# Patient Record
Sex: FEMALE | Race: WHITE | NOT HISPANIC OR LATINO | URBAN - METROPOLITAN AREA
[De-identification: names, ages, dates, MRNs, and addresses within clinical notes are randomized per-mention and may not be internally consistent; named-entity substitution may affect disease eponyms.]

---

## 2017-01-03 ENCOUNTER — EMERGENCY (EMERGENCY)
Facility: HOSPITAL | Age: 32
LOS: 1 days | Discharge: HOME | End: 2017-01-03
Attending: EMERGENCY MEDICINE | Admitting: EMERGENCY MEDICINE
Payer: OTHER GOVERNMENT

## 2017-01-03 ENCOUNTER — APPOINTMENT (OUTPATIENT)
Dept: OBGYN | Facility: CLINIC | Age: 32
End: 2017-01-03

## 2017-01-03 VITALS
TEMPERATURE: 98 F | HEART RATE: 85 BPM | DIASTOLIC BLOOD PRESSURE: 127 MMHG | OXYGEN SATURATION: 98 % | SYSTOLIC BLOOD PRESSURE: 169 MMHG | RESPIRATION RATE: 18 BRPM

## 2017-01-03 VITALS
OXYGEN SATURATION: 100 % | SYSTOLIC BLOOD PRESSURE: 107 MMHG | HEART RATE: 96 BPM | DIASTOLIC BLOOD PRESSURE: 85 MMHG | RESPIRATION RATE: 20 BRPM

## 2017-01-03 DIAGNOSIS — Z34.03 ENCOUNTER FOR SUPERVISION OF NORMAL FIRST PREGNANCY, THIRD TRIMESTER: ICD-10-CM

## 2017-01-03 DIAGNOSIS — O09.293 SUPERVISION OF PREGNANCY WITH OTHER POOR REPRODUCTIVE OR OBSTETRIC HISTORY, THIRD TRIMESTER: ICD-10-CM

## 2017-01-03 LAB
ALBUMIN SERPL ELPH-MCNC: 4.7 G/DL — SIGNIFICANT CHANGE UP (ref 3.3–5)
ALP SERPL-CCNC: 115 U/L — SIGNIFICANT CHANGE UP (ref 40–120)
ALT FLD-CCNC: 68 U/L RC — HIGH (ref 10–45)
ANION GAP SERPL CALC-SCNC: 16 MMOL/L — SIGNIFICANT CHANGE UP (ref 5–17)
APTT BLD: 32.2 SEC — SIGNIFICANT CHANGE UP (ref 27.5–37.4)
AST SERPL-CCNC: 41 U/L — HIGH (ref 10–40)
BASOPHILS # BLD AUTO: 0 K/UL — SIGNIFICANT CHANGE UP (ref 0–0.2)
BASOPHILS NFR BLD AUTO: 0.4 % — SIGNIFICANT CHANGE UP (ref 0–2)
BILIRUB SERPL-MCNC: 0.2 MG/DL — SIGNIFICANT CHANGE UP (ref 0.2–1.2)
BUN SERPL-MCNC: 16 MG/DL — SIGNIFICANT CHANGE UP (ref 7–23)
CALCIUM SERPL-MCNC: 11.1 MG/DL — HIGH (ref 8.4–10.5)
CHLORIDE SERPL-SCNC: 104 MMOL/L — SIGNIFICANT CHANGE UP (ref 96–108)
CO2 SERPL-SCNC: 22 MMOL/L — SIGNIFICANT CHANGE UP (ref 22–31)
CREAT SERPL-MCNC: 0.88 MG/DL — SIGNIFICANT CHANGE UP (ref 0.5–1.3)
EOSINOPHIL # BLD AUTO: 0.2 K/UL — SIGNIFICANT CHANGE UP (ref 0–0.5)
EOSINOPHIL NFR BLD AUTO: 2.5 % — SIGNIFICANT CHANGE UP (ref 0–6)
GLUCOSE SERPL-MCNC: 105 MG/DL — HIGH (ref 70–99)
HCT VFR BLD CALC: 38.1 % — SIGNIFICANT CHANGE UP (ref 34.5–45)
HGB BLD-MCNC: 13.2 G/DL — SIGNIFICANT CHANGE UP (ref 11.5–15.5)
INR BLD: 1.01 RATIO — SIGNIFICANT CHANGE UP (ref 0.88–1.16)
LYMPHOCYTES # BLD AUTO: 2.6 K/UL — SIGNIFICANT CHANGE UP (ref 1–3.3)
LYMPHOCYTES # BLD AUTO: 28.5 % — SIGNIFICANT CHANGE UP (ref 13–44)
MCHC RBC-ENTMCNC: 28.8 PG — SIGNIFICANT CHANGE UP (ref 27–34)
MCHC RBC-ENTMCNC: 34.7 GM/DL — SIGNIFICANT CHANGE UP (ref 32–36)
MCV RBC AUTO: 82.9 FL — SIGNIFICANT CHANGE UP (ref 80–100)
MONOCYTES # BLD AUTO: 0.6 K/UL — SIGNIFICANT CHANGE UP (ref 0–0.9)
MONOCYTES NFR BLD AUTO: 6.1 % — SIGNIFICANT CHANGE UP (ref 2–14)
NEUTROPHILS # BLD AUTO: 5.7 K/UL — SIGNIFICANT CHANGE UP (ref 1.8–7.4)
NEUTROPHILS NFR BLD AUTO: 62.5 % — SIGNIFICANT CHANGE UP (ref 43–77)
PLATELET # BLD AUTO: 327 K/UL — SIGNIFICANT CHANGE UP (ref 150–400)
POTASSIUM SERPL-MCNC: 4.2 MMOL/L — SIGNIFICANT CHANGE UP (ref 3.5–5.3)
POTASSIUM SERPL-SCNC: 4.2 MMOL/L — SIGNIFICANT CHANGE UP (ref 3.5–5.3)
PROT SERPL-MCNC: 7.4 G/DL — SIGNIFICANT CHANGE UP (ref 6–8.3)
PROTHROM AB SERPL-ACNC: 10.9 SEC — SIGNIFICANT CHANGE UP (ref 10–13.1)
RBC # BLD: 4.59 M/UL — SIGNIFICANT CHANGE UP (ref 3.8–5.2)
RBC # FLD: 13.5 % — SIGNIFICANT CHANGE UP (ref 10.3–14.5)
SODIUM SERPL-SCNC: 142 MMOL/L — SIGNIFICANT CHANGE UP (ref 135–145)
URATE SERPL-MCNC: 5.4 MG/DL — SIGNIFICANT CHANGE UP (ref 2.5–7)
WBC # BLD: 9.2 K/UL — SIGNIFICANT CHANGE UP (ref 3.8–10.5)
WBC # FLD AUTO: 9.2 K/UL — SIGNIFICANT CHANGE UP (ref 3.8–10.5)

## 2017-01-03 PROCEDURE — 99053 MED SERV 10PM-8AM 24 HR FAC: CPT

## 2017-01-03 PROCEDURE — 85610 PROTHROMBIN TIME: CPT

## 2017-01-03 PROCEDURE — 93005 ELECTROCARDIOGRAM TRACING: CPT

## 2017-01-03 PROCEDURE — 93010 ELECTROCARDIOGRAM REPORT: CPT

## 2017-01-03 PROCEDURE — 84550 ASSAY OF BLOOD/URIC ACID: CPT

## 2017-01-03 PROCEDURE — 85027 COMPLETE CBC AUTOMATED: CPT

## 2017-01-03 PROCEDURE — 99284 EMERGENCY DEPT VISIT MOD MDM: CPT | Mod: 25

## 2017-01-03 PROCEDURE — 85730 THROMBOPLASTIN TIME PARTIAL: CPT

## 2017-01-03 PROCEDURE — 80053 COMPREHEN METABOLIC PANEL: CPT

## 2017-01-03 PROCEDURE — 96374 THER/PROPH/DIAG INJ IV PUSH: CPT

## 2017-01-03 RX ORDER — MAGNESIUM SULFATE 500 MG/ML
4 VIAL (ML) INJECTION ONCE
Qty: 0 | Refills: 0 | Status: DISCONTINUED | OUTPATIENT
Start: 2017-01-03 | End: 2017-01-03

## 2017-01-03 RX ORDER — LABETALOL HCL 100 MG
10 TABLET ORAL ONCE
Qty: 0 | Refills: 0 | Status: COMPLETED | OUTPATIENT
Start: 2017-01-03 | End: 2017-01-03

## 2017-01-03 RX ORDER — MAGNESIUM SULFATE 500 MG/ML
2 VIAL (ML) INJECTION
Qty: 40 | Refills: 0 | Status: DISCONTINUED | OUTPATIENT
Start: 2017-01-03 | End: 2017-01-03

## 2017-01-03 RX ADMIN — Medication 10 MILLIGRAM(S): at 01:59

## 2017-01-03 NOTE — ED PROVIDER NOTE - ATTENDING CONTRIBUTION TO CARE
I was physically present for the E/M service provided. I agree with above history, physical, and plan which I have reviewed and edited where appropriate. I was physically present for the key portions of the service provided.    See MDM.

## 2017-01-03 NOTE — ED PROVIDER NOTE - PROGRESS NOTE DETAILS
Seen by cardiology in ED. Cleared for d/c with close outpatient follow-up and strict return precautions.  Alfredo PGY-3 Evaluated by OBGYN in ED. 6 weeks postpartum, out of window for mag as per OBGYN. Also seen by cardiology in ED. Cleared for d/c with close outpatient follow-up and strict return precautions.  Alfredo PGY-3

## 2017-01-03 NOTE — ED ADULT NURSE NOTE - OBJECTIVE STATEMENT
31 y.o. Female presents to the ED c/o high blood pressure. Pt is 6 weeks post partum - 3rd pregnancy. Hx of chronic HTN. Pt reports feeling "light headed around 0730pm and took 500mg of labetalol." Pt also claims taking "procardia at 1010pm and 12am." Denies any light headedness now, dizziness, vision/hearing changes, weakness/numbness/tingling, N/V/D, urinary/bowel complications, fever/chills. Pt is in no current distress. Comfort and safety provided. Family at bedside.

## 2017-01-03 NOTE — ED PROVIDER NOTE - MEDICAL DECISION MAKING DETAILS
HTN 6 weeks post-partum. Ob/Gyn consulted state pt is outside window for eclampsia. Recommend f/u with cardiology for continued BP management. Pt denies HA, dizziness or vision changes. Labs WNL. BP improved with meds in ED. f/u with cards outpt. JESSICA.

## 2017-01-03 NOTE — ED PROVIDER NOTE - OBJECTIVE STATEMENT
31yF h/o chronic HTN (follows with Marely Reaves) and recent pregnancy (6 weeks post-partum), pregnancy c/b preeclampsia on procardia and labetalol, compliant with medications presents for  high blood pressure. Sent in after readings of systolic BP in 180s at home. Patient checks BP regularly, was asymptomatic. Denies chest pain, headache, vision changes, syncope. No h/o eclampsia. Called Dr. Reaves's service after high BP reading and was advised to come to the ER. OBGYN Dr. Levy.

## 2017-01-05 ENCOUNTER — APPOINTMENT (OUTPATIENT)
Dept: CARDIOLOGY | Facility: CLINIC | Age: 32
End: 2017-01-05

## 2017-01-05 VITALS
BODY MASS INDEX: 25.91 KG/M2 | HEART RATE: 87 BPM | DIASTOLIC BLOOD PRESSURE: 93 MMHG | SYSTOLIC BLOOD PRESSURE: 131 MMHG | WEIGHT: 132 LBS | HEIGHT: 60 IN | OXYGEN SATURATION: 99 %

## 2017-01-06 ENCOUNTER — APPOINTMENT (OUTPATIENT)
Dept: OBGYN | Facility: CLINIC | Age: 32
End: 2017-01-06

## 2017-01-10 ENCOUNTER — APPOINTMENT (OUTPATIENT)
Dept: OBGYN | Facility: CLINIC | Age: 32
End: 2017-01-10

## 2017-01-10 VITALS
SYSTOLIC BLOOD PRESSURE: 160 MMHG | DIASTOLIC BLOOD PRESSURE: 100 MMHG | BODY MASS INDEX: 26.11 KG/M2 | HEIGHT: 60 IN | WEIGHT: 133 LBS

## 2017-01-10 DIAGNOSIS — Z30.09 ENCOUNTER FOR OTHER GENERAL COUNSELING AND ADVICE ON CONTRACEPTION: ICD-10-CM

## 2017-02-07 ENCOUNTER — APPOINTMENT (OUTPATIENT)
Dept: CARDIOLOGY | Facility: CLINIC | Age: 32
End: 2017-02-07

## 2017-02-07 VITALS
SYSTOLIC BLOOD PRESSURE: 144 MMHG | HEART RATE: 98 BPM | DIASTOLIC BLOOD PRESSURE: 106 MMHG | WEIGHT: 125 LBS | HEIGHT: 60 IN | OXYGEN SATURATION: 99 % | BODY MASS INDEX: 24.54 KG/M2

## 2017-02-07 VITALS — SYSTOLIC BLOOD PRESSURE: 118 MMHG | DIASTOLIC BLOOD PRESSURE: 92 MMHG

## 2017-02-07 RX ORDER — NIFEDIPINE 30 MG/1
30 TABLET, FILM COATED, EXTENDED RELEASE ORAL
Qty: 30 | Refills: 5 | Status: DISCONTINUED | COMMUNITY
Start: 2017-01-05 | End: 2017-02-07

## 2017-03-16 ENCOUNTER — APPOINTMENT (OUTPATIENT)
Dept: CARDIOLOGY | Facility: CLINIC | Age: 32
End: 2017-03-16

## 2017-03-16 VITALS
SYSTOLIC BLOOD PRESSURE: 139 MMHG | HEIGHT: 60 IN | HEART RATE: 86 BPM | DIASTOLIC BLOOD PRESSURE: 99 MMHG | BODY MASS INDEX: 25.52 KG/M2 | WEIGHT: 130 LBS | OXYGEN SATURATION: 100 %

## 2017-03-16 VITALS — SYSTOLIC BLOOD PRESSURE: 130 MMHG | DIASTOLIC BLOOD PRESSURE: 86 MMHG

## 2017-05-04 ENCOUNTER — APPOINTMENT (OUTPATIENT)
Dept: CARDIOLOGY | Facility: CLINIC | Age: 32
End: 2017-05-04

## 2017-05-04 VITALS
HEART RATE: 92 BPM | SYSTOLIC BLOOD PRESSURE: 150 MMHG | HEIGHT: 60 IN | BODY MASS INDEX: 27.09 KG/M2 | DIASTOLIC BLOOD PRESSURE: 107 MMHG | OXYGEN SATURATION: 100 % | WEIGHT: 138 LBS

## 2017-05-04 VITALS — SYSTOLIC BLOOD PRESSURE: 126 MMHG | DIASTOLIC BLOOD PRESSURE: 86 MMHG

## 2019-09-28 ENCOUNTER — APPOINTMENT (OUTPATIENT)
Dept: INTERNAL MEDICINE | Facility: CLINIC | Age: 34
End: 2019-09-28
Payer: OTHER GOVERNMENT

## 2019-09-28 VITALS
BODY MASS INDEX: 29.25 KG/M2 | SYSTOLIC BLOOD PRESSURE: 120 MMHG | DIASTOLIC BLOOD PRESSURE: 70 MMHG | TEMPERATURE: 99.2 F | WEIGHT: 149 LBS | OXYGEN SATURATION: 99 % | HEART RATE: 96 BPM | HEIGHT: 60 IN

## 2019-09-28 DIAGNOSIS — Z00.00 ENCOUNTER FOR GENERAL ADULT MEDICAL EXAMINATION W/OUT ABNORMAL FINDINGS: ICD-10-CM

## 2019-09-28 DIAGNOSIS — Z83.3 FAMILY HISTORY OF DIABETES MELLITUS: ICD-10-CM

## 2019-09-28 DIAGNOSIS — I10 ESSENTIAL (PRIMARY) HYPERTENSION: ICD-10-CM

## 2019-09-28 DIAGNOSIS — G89.29 DORSALGIA, UNSPECIFIED: ICD-10-CM

## 2019-09-28 DIAGNOSIS — M54.9 DORSALGIA, UNSPECIFIED: ICD-10-CM

## 2019-09-28 DIAGNOSIS — Z82.49 FAMILY HISTORY OF ISCHEMIC HEART DISEASE AND OTHER DISEASES OF THE CIRCULATORY SYSTEM: ICD-10-CM

## 2019-09-28 PROCEDURE — 36415 COLL VENOUS BLD VENIPUNCTURE: CPT

## 2019-09-28 PROCEDURE — 99385 PREV VISIT NEW AGE 18-39: CPT | Mod: 25

## 2019-09-28 NOTE — ASSESSMENT
[FreeTextEntry1] : 1) CPE \par - diet / exercise discussed \par - check lipid and BG \par - UTD PAP / Tdap \par \par 2) HTN\par - ct meds \par - check labs\par - psychology referral for stress \par \par 3) back pain \par - long standing / getting worse / radiates to L leg\par - p.t \par - x-RAY

## 2019-09-28 NOTE — HEALTH RISK ASSESSMENT
[Good] : ~his/her~ current health as good [No] : No [] : No [0] : 1) Little interest or pleasure doing things: Not at all (0) [1] : 2) Feeling down, depressed, or hopeless for several days (1) [de-identified] : low salt diet  [de-identified] : on and off  [FreeTextEntry1] : anxiety -  left for [place]  [None] : None [With Significant Other] : lives with significant other [Unemployed] : unemployed [] :  [Sexually Active] : sexually active [Feels Safe at Home] : Feels safe at home [Reports changes in hearing] : Reports no changes in hearing [Reports changes in vision] : Reports changes in vision [Reports changes in dental health] : Reports changes in dental health [Carbon Monoxide Detector] : carbon monoxide detector [Safety elements used in home] : safety elements used in home [Smoke Detector] : smoke detector [Seat Belt] :  uses seat belt [PapSmearDate] : 2018

## 2019-09-28 NOTE — HISTORY OF PRESENT ILLNESS
[FreeTextEntry1] : here to establish care\par h/o HTN X 18 yrs \par \par Has had low back pain x many yrs , radiates  to the L leg\par gets worse with standing , and at night\par has never had PT , no h/o injury to back

## 2019-09-30 ENCOUNTER — TRANSCRIPTION ENCOUNTER (OUTPATIENT)
Age: 34
End: 2019-09-30

## 2019-10-01 LAB
25(OH)D3 SERPL-MCNC: 21.7 NG/ML
ALBUMIN SERPL ELPH-MCNC: 4.8 G/DL
ALP BLD-CCNC: 88 U/L
ALT SERPL-CCNC: 36 U/L
ANION GAP SERPL CALC-SCNC: 16 MMOL/L
AST SERPL-CCNC: 25 U/L
BASOPHILS # BLD AUTO: 0.06 K/UL
BASOPHILS NFR BLD AUTO: 0.6 %
BILIRUB SERPL-MCNC: 0.4 MG/DL
BUN SERPL-MCNC: 10 MG/DL
CALCIUM SERPL-MCNC: 9.9 MG/DL
CHLORIDE SERPL-SCNC: 103 MMOL/L
CHOLEST SERPL-MCNC: 165 MG/DL
CHOLEST/HDLC SERPL: 4 RATIO
CO2 SERPL-SCNC: 22 MMOL/L
CREAT SERPL-MCNC: 0.66 MG/DL
EOSINOPHIL # BLD AUTO: 0.14 K/UL
EOSINOPHIL NFR BLD AUTO: 1.3 %
ESTIMATED AVERAGE GLUCOSE: 105 MG/DL
GLUCOSE SERPL-MCNC: 99 MG/DL
HBA1C MFR BLD HPLC: 5.3 %
HCT VFR BLD CALC: 44.2 %
HDLC SERPL-MCNC: 41 MG/DL
HGB BLD-MCNC: 14.2 G/DL
IMM GRANULOCYTES NFR BLD AUTO: 0.2 %
LDLC SERPL CALC-MCNC: 90 MG/DL
LYMPHOCYTES # BLD AUTO: 1.71 K/UL
LYMPHOCYTES NFR BLD AUTO: 16.2 %
MAN DIFF?: NORMAL
MCHC RBC-ENTMCNC: 27.3 PG
MCHC RBC-ENTMCNC: 32.1 GM/DL
MCV RBC AUTO: 84.8 FL
MONOCYTES # BLD AUTO: 0.63 K/UL
MONOCYTES NFR BLD AUTO: 6 %
NEUTROPHILS # BLD AUTO: 7.98 K/UL
NEUTROPHILS NFR BLD AUTO: 75.7 %
PLATELET # BLD AUTO: 339 K/UL
POTASSIUM SERPL-SCNC: 4.4 MMOL/L
PROT SERPL-MCNC: 7.4 G/DL
RBC # BLD: 5.21 M/UL
RBC # FLD: 14 %
SODIUM SERPL-SCNC: 141 MMOL/L
TRIGL SERPL-MCNC: 170 MG/DL
TSH SERPL-ACNC: 3.55 UIU/ML
WBC # FLD AUTO: 10.54 K/UL

## 2019-10-23 ENCOUNTER — FORM ENCOUNTER (OUTPATIENT)
Age: 34
End: 2019-10-23

## 2019-10-24 ENCOUNTER — OUTPATIENT (OUTPATIENT)
Dept: OUTPATIENT SERVICES | Facility: HOSPITAL | Age: 34
LOS: 1 days | End: 2019-10-24
Payer: OTHER GOVERNMENT

## 2019-10-24 ENCOUNTER — APPOINTMENT (OUTPATIENT)
Dept: RADIOLOGY | Facility: CLINIC | Age: 34
End: 2019-10-24
Payer: OTHER GOVERNMENT

## 2019-10-24 DIAGNOSIS — M54.9 DORSALGIA, UNSPECIFIED: ICD-10-CM

## 2019-10-24 PROCEDURE — 72110 X-RAY EXAM L-2 SPINE 4/>VWS: CPT | Mod: 26

## 2019-10-24 PROCEDURE — 72110 X-RAY EXAM L-2 SPINE 4/>VWS: CPT

## 2019-10-29 ENCOUNTER — APPOINTMENT (OUTPATIENT)
Dept: OPHTHALMOLOGY | Facility: CLINIC | Age: 34
End: 2019-10-29
Payer: OTHER GOVERNMENT

## 2019-10-29 ENCOUNTER — NON-APPOINTMENT (OUTPATIENT)
Age: 34
End: 2019-10-29

## 2019-10-29 PROCEDURE — 92004 COMPRE OPH EXAM NEW PT 1/>: CPT

## 2019-10-29 PROCEDURE — 92285 EXTERNAL OCULAR PHOTOGRAPHY: CPT

## 2019-11-08 ENCOUNTER — OUTPATIENT (OUTPATIENT)
Dept: OUTPATIENT SERVICES | Facility: HOSPITAL | Age: 34
LOS: 1 days | End: 2019-11-08

## 2019-11-08 ENCOUNTER — APPOINTMENT (OUTPATIENT)
Dept: CT IMAGING | Facility: CLINIC | Age: 34
End: 2019-11-08
Payer: OTHER GOVERNMENT

## 2019-11-08 PROCEDURE — 72131 CT LUMBAR SPINE W/O DYE: CPT | Mod: 26

## 2019-12-11 ENCOUNTER — RX RENEWAL (OUTPATIENT)
Age: 34
End: 2019-12-11

## 2019-12-11 ENCOUNTER — APPOINTMENT (OUTPATIENT)
Dept: PEDIATRIC ALLERGY IMMUNOLOGY | Facility: CLINIC | Age: 34
End: 2019-12-11

## 2019-12-14 ENCOUNTER — APPOINTMENT (OUTPATIENT)
Dept: INTERNAL MEDICINE | Facility: CLINIC | Age: 34
End: 2019-12-14
Payer: OTHER GOVERNMENT

## 2019-12-14 VITALS
BODY MASS INDEX: 28.27 KG/M2 | DIASTOLIC BLOOD PRESSURE: 80 MMHG | HEIGHT: 60 IN | WEIGHT: 144 LBS | HEART RATE: 85 BPM | OXYGEN SATURATION: 98 % | SYSTOLIC BLOOD PRESSURE: 125 MMHG | TEMPERATURE: 99.1 F

## 2019-12-14 DIAGNOSIS — O10.012 PRE-EXISTING ESSENTIAL HYPERTENSION COMPLICATING PREGNANCY, SECOND TRIMESTER: ICD-10-CM

## 2019-12-14 DIAGNOSIS — T78.40XA ALLERGY, UNSPECIFIED, INITIAL ENCOUNTER: ICD-10-CM

## 2019-12-14 PROCEDURE — 99213 OFFICE O/P EST LOW 20 MIN: CPT

## 2019-12-14 RX ORDER — CARVEDILOL 12.5 MG/1
12.5 TABLET, FILM COATED ORAL TWICE DAILY
Qty: 180 | Refills: 4 | Status: ACTIVE | COMMUNITY
Start: 2019-09-28 | End: 1900-01-01

## 2019-12-14 RX ORDER — AMLODIPINE BESYLATE 5 MG/1
5 TABLET ORAL DAILY
Qty: 90 | Refills: 4 | Status: ACTIVE | COMMUNITY
Start: 2019-09-28 | End: 1900-01-01

## 2019-12-14 NOTE — ASSESSMENT
[FreeTextEntry1] : 1) HTN \par - ct meds \par - low salt diet \par - f/u in 6 month \par \par 2) allergy referal \par \par 3) viral URI - supportive  care - fluids / rest / OTC tylenol as needed

## 2019-12-14 NOTE — PHYSICAL EXAM
[Normal] : normal rate, regular rhythm, normal S1 and S2 and no murmur heard [de-identified] : ITCHING EYES

## 2019-12-14 NOTE — HISTORY OF PRESENT ILLNESS
[FreeTextEntry1] : f/u HTN \par \par Taking meds w/out AE \par no chest pain or SOB \par \par she has had a cold x 2 days \par no fever , runny nose \par no chest pain / fever or SOB \par \par Pt also has itchy eyes / rashes - on and off - would like to see allergist

## 2019-12-19 ENCOUNTER — APPOINTMENT (OUTPATIENT)
Dept: OPHTHALMOLOGY | Facility: CLINIC | Age: 34
End: 2019-12-19

## 2020-04-06 ENCOUNTER — APPOINTMENT (OUTPATIENT)
Dept: PEDIATRIC ALLERGY IMMUNOLOGY | Facility: CLINIC | Age: 35
End: 2020-04-06

## 2020-07-29 ENCOUNTER — APPOINTMENT (OUTPATIENT)
Dept: INTERNAL MEDICINE | Facility: CLINIC | Age: 35
End: 2020-07-29
Payer: OTHER GOVERNMENT

## 2020-07-29 VITALS
HEIGHT: 60 IN | WEIGHT: 148 LBS | SYSTOLIC BLOOD PRESSURE: 136 MMHG | HEART RATE: 88 BPM | TEMPERATURE: 99.8 F | BODY MASS INDEX: 29.06 KG/M2 | DIASTOLIC BLOOD PRESSURE: 90 MMHG | OXYGEN SATURATION: 98 %

## 2020-07-29 DIAGNOSIS — Z86.69 PERSONAL HISTORY OF OTHER DISEASES OF THE NERVOUS SYSTEM AND SENSE ORGANS: ICD-10-CM

## 2020-07-29 DIAGNOSIS — H60.90 UNSPECIFIED OTITIS EXTERNA, UNSPECIFIED EAR: ICD-10-CM

## 2020-07-29 PROCEDURE — 99213 OFFICE O/P EST LOW 20 MIN: CPT

## 2020-07-29 RX ORDER — AMOXICILLIN AND CLAVULANATE POTASSIUM 875; 125 MG/1; MG/1
875-125 TABLET, COATED ORAL
Qty: 20 | Refills: 0 | Status: ACTIVE | COMMUNITY
Start: 2020-07-29 | End: 1900-01-01

## 2020-07-29 RX ORDER — CIPROFLOXACIN HYDROCHLORIDE AND HYDROCORTISONE 2; 10 MG/ML; MG/ML
0.2-1 SUSPENSION/ DROPS AURICULAR (OTIC) TWICE DAILY
Qty: 1 | Refills: 0 | Status: ACTIVE | COMMUNITY
Start: 2020-07-29 | End: 1900-01-01

## 2020-07-29 NOTE — PHYSICAL EXAM
[Normal] : no acute distress, well nourished, well developed and well-appearing [Normal Oropharynx] : the oropharynx was normal [No Lymphadenopathy] : no lymphadenopathy [de-identified] : L TM - moderate erythema; very tender ear canal with some maceration  [Clear to Auscultation] : lungs were clear to auscultation bilaterally

## 2020-07-29 NOTE — ASSESSMENT
Nursing Notes  Report given to Martina ROSALES.  Walking rounding.  Patient stable.        Huddle Comments     [FreeTextEntry1] : otitis, unclear externa vs media; favor media dx but pt very concerned about itching in ear as well.\par will treat both\par instructions given for pt\par fu sx persist or worsen\par fu for more definitive allergy tx as needed

## 2020-07-29 NOTE — HISTORY OF PRESENT ILLNESS
[FreeTextEntry8] : 3 d worsening ear pain and ear itchting, now with jaw pain; at first minor but had difficulty sleeping yesterday due to pain & itching; hx allergies on zyrtec with only minor relief; went swimming yesterday ?? made worse, No sick contacts; no stuffiness, cough, fever, sore throat.\par plans to go to allergist.\par no allergy to pcn.

## 2020-08-03 ENCOUNTER — TRANSCRIPTION ENCOUNTER (OUTPATIENT)
Age: 35
End: 2020-08-03

## 2020-08-06 ENCOUNTER — APPOINTMENT (OUTPATIENT)
Dept: INTERNAL MEDICINE | Facility: CLINIC | Age: 35
End: 2020-08-06

## 2020-08-24 ENCOUNTER — APPOINTMENT (OUTPATIENT)
Dept: OTOLARYNGOLOGY | Facility: CLINIC | Age: 35
End: 2020-08-24

## 2020-12-23 PROBLEM — Z86.69 HISTORY OF ACUTE OTITIS MEDIA: Status: RESOLVED | Noted: 2020-07-29 | Resolved: 2020-12-23

## 2022-03-07 NOTE — ED PROVIDER NOTE - NEUROLOGICAL SPEECH
"    Subjective:    Patient ID:  Lauren Junior is a 74 y.o. female.    Chief Complaint:  Hypercalcemia         Pt presents to follow up for hypercalcemia, hyperparathyroidism, and review of chronic medical conditions as listed in the visit diagnosis section of this encounter.      Other PMH: Was diagnosed with breast ca now s/p db masectomy and is considered cured.      Is a retired nurse     Referring provider, Dr. Angelo.     Overview: Had routine labs which showed elevated calcium, and pt was referred to endo for further work up.      After review of past labs, onset about 3 years ago and had intermittent elevated calcium levels. Also noted to have slightly increase in albumin.     Was going to the gym 3 x per week until she broke her L arm. Was in the yard, tripped over something and landed on her hand. Plans to have surgery next week.     With regards to the hypercalcemia and hyperparathyroidism:     Is not currently on any ca supplements. Notes she eats several servings of calcium rich foods daily.   Taking vitamin D: 2000 IU daily     No   Yes  (X )    ( )           Neuro symptoms  (X)    ( ) Depression  (X )    ( )           Mental Fog     No   Yes  ( )    (X )           Fractures/osteoporosis, L arm   (X )    ( )           >2" height loss  ( )    (X )           Kidney stones. Last years ago  (X )    ( )           GFR <60  (X )    ( )           Constipation  (X )    ( )           Bone pain        No N/V or abdominal pain.      No   Yes  (X )    ( )           HCTZ  (X )    ( )           Lithium        Lab Results       Component                Value               Date                       PTH                      94.6 (H)            07/29/2021                 [id number]             46                  08/03/2018                 CALCIUM                  10.7 (H)            07/29/2021                 CALCIUM                  10.9 (H)            06/09/2021                 CALCIUM                  9.9             "     2020                 ALKPHOS                  85                  2021                 ALKPHOS                  82                  2021                 ALKPHOS                  85                  2020                 TSH                      <0.015 (L)          2021                      Review of Systems   Constitutional: Negative for fatigue.   Respiratory: Negative for shortness of breath.    Cardiovascular: Negative for palpitations.   Neurological: Negative for tremors.        Past Medical History:   Diagnosis Date    Acid reflux 2015    Arthritis     Atrial fibrillation 2015    Breast cancer     Papillary Ductal    Deficiency of clotting factor     Factor 11    Elevated blood pressure 2015    Encounter for blood transfusion 1967    Fibromyalgia     History of hepatitis C     s/p Interferon & Ribavarin     Hypothyroidism 2015    Kidney stones     Migraines     Thoracic outlet syndrome     s/p B rib resections      Social History     Tobacco Use    Smoking status: Former Smoker     Packs/day: 0.50     Years: 30.00     Pack years: 15.00     Types: Cigarettes     Quit date: 1997     Years since quittin.7    Smokeless tobacco: Never Used   Substance Use Topics    Alcohol use: Yes     Alcohol/week: 7.0 standard drinks     Types: 7 Shots of liquor per week     Comment: one shot of vodka daily    Drug use: No     Family History   Problem Relation Age of Onset    Stroke Mother     Alzheimer's disease Mother     Pancreatic cancer Father     Cancer Father         Pancreatic mets to liver    Cancer Sister         astrocytoma    Melanoma Daughter     Cancer Daughter         malignant melanoma    Cancer Brother         skin cancer      Past Surgical History:   Procedure Laterality Date    ADENOIDECTOMY      big toe pinned      wilmer rib resection      CYSTOSCOPY W/ STONE MANIPULATION      DEBRIDEMENT OF TENDON Left 2018     Procedure: DEBRIDEMENT, TENDON ACHILLES;  Surgeon: Goyo Hunter MD;  Location: [facility] OR;  Service: Orthopedics;  Laterality: Left;    DILATION AND CURETTAGE OF UTERUS      ENDOSCOPIC GASTROCNEMIUS RECESSION Left 8/13/2018    Procedure: RECESSION, GASTROCNEMIUS;  Surgeon: Goyo Hunter MD;  Location: [facility] OR;  Service: Orthopedics;  Laterality: Left;    HYSTERECTOMY      and right oophrectomy    INJECTION OF JOINT Left 8/13/2018    Procedure: INJECTION, JOINT - Injection Platelet Rich Plasma;  Surgeon: Goyo Hunter MD;  Location: [facility] OR;  Service: Orthopedics;  Laterality: Left;    SIMPLE MASTECTOMY Bilateral 12/9/2021    Procedure: MASTECTOMY, SIMPLE;  Surgeon: Mitzi Gerber MD;  Location: [facility];  Service: General;  Laterality: Bilateral;    TONSILLECTOMY            Current Outpatient Medications:     amitriptyline (ELAVIL) 100 MG tablet, Take 1 tablet (100 mg total) by mouth every evening., Disp: 90 tablet, Rfl: 3    amLODIPine (NORVASC) 5 MG tablet, Take 1 tablet (5 mg total) by mouth once daily., Disp: 90 tablet, Rfl: 3    biotin 5,000 mcg TbDL, Take 1 tablet by mouth Daily., Disp: , Rfl:     celecoxib (CELEBREX) 200 MG capsule, Take 200 mg by mouth once daily., Disp: , Rfl:     levothyroxine (SYNTHROID) 112 MCG tablet, Take 1 tablet (112 mcg total) by mouth once daily., Disp: 90 tablet, Rfl: 3    metoprolol succinate (TOPROL-XL) 25 MG 24 hr tablet, Take 1 tablet (25 mg total) by mouth once daily., Disp: 90 tablet, Rfl: 3    multivitamin Tab, Take 1 tablet by mouth once daily. , Disp: , Rfl:     omeprazole (PRILOSEC) 40 MG capsule, Take 1 capsule (40 mg total) by mouth once daily., Disp: 90 capsule, Rfl: 3    polyethylene glycol (GLYCOLAX) 17 gram/dose powder, Take 17 g by mouth daily as needed. , Disp: , Rfl:     pregabalin (LYRICA) 100 MG capsule, Take 1 capsule (100 mg total) by mouth 2 (two) times daily., Disp: 180 capsule, Rfl: 3    traMADoL (ULTRAM) 50 mg tablet,  "Take 1 tablet (50 mg total) by mouth every 6 (six) hours as needed for Pain., Disp: 90 tablet, Rfl: 0    venlafaxine (EFFEXOR-XR) 75 MG 24 hr capsule, Take 1 capsule (75 mg total) by mouth 2 (two) times daily., Disp: 180 capsule, Rfl: 3    vitamin D 1000 units Tab, Take 2,000 Units by mouth every evening. , Disp: , Rfl:     HYDROcodone-acetaminophen (NORCO) 5-325 mg per tablet, Take 1 tablet by mouth every 8 (eight) hours as needed for Pain., Disp: 13 tablet, Rfl: 0     Review of patient's allergies indicates:   Allergen Reactions    Meperidine Itching    Adhesive      Tape        Objective:   /76   Pulse 67   Ht 5' 8" (1.727 m)   Wt 94.4 kg (208 lb 1.8 oz)   SpO2 99%   BMI 31.64 kg/m²   BP Readings from Last 3 Encounters:   03/07/22 118/76   03/02/22 120/76   01/31/22 110/70     Wt Readings from Last 3 Encounters:   03/07/22 0807 94.4 kg (208 lb 1.8 oz)   03/02/22 1131 95.5 kg (210 lb 8.6 oz)   01/31/22 0913 95.5 kg (210 lb 8.6 oz)          Physical Exam  Vitals reviewed.   Constitutional:       General: She is not in acute distress.     Appearance: Normal appearance. She is well-developed. She is not ill-appearing, toxic-appearing or diaphoretic.   HENT:      Head: Normocephalic and atraumatic.   Eyes:      General: No scleral icterus.  Neck:      Trachea: No tracheal deviation.      Comments: Mild fullness of R lower thyroid  Cardiovascular:      Rate and Rhythm: Normal rate and regular rhythm.      Heart sounds: No murmur heard.  Pulmonary:      Effort: Pulmonary effort is normal. No respiratory distress.   Musculoskeletal:      Comments: L hand/wrist in splint   Lymphadenopathy:      Cervical: No cervical adenopathy.   Neurological:      Mental Status: She is alert and oriented to person, place, and time.   Psychiatric:         Thought Content: Thought content normal.           Lab Results   Component Value Date     03/02/2022    K 5.2 (H) 03/02/2022     03/02/2022    CO2 27 " 03/02/2022    BUN 19 (H) 03/02/2022    CREATININE 0.70 03/02/2022    GLU 97 03/02/2022    AST 25 03/02/2022    ALT 15 03/02/2022    ALBUMIN 4.2 03/02/2022    PROT 7.0 03/02/2022    BILITOT 0.3 03/02/2022    WBC 4.47 03/02/2022    HGB 13.5 03/02/2022    HCT 42.9 03/02/2022    MCV 96 03/02/2022    MCH 30.1 03/02/2022     03/02/2022    MPV 10.0 03/02/2022    GRAN 1.9 03/02/2022    GRAN 43.5 03/02/2022    LYMPH 1.8 03/02/2022    LYMPH 40.0 03/02/2022    CHOL 215 (H) 01/28/2022    HDL 69 01/28/2022    LDLCALC 134.2 01/28/2022    TRIG 59 01/28/2022       Lab Results   Component Value Date    TSH <0.015 (L) 06/09/2021    FREET4 1.61 06/09/2021        Thyroid Labs Latest Ref Rng & Units 8/10/2021 9/15/2021 3/2/2022   TSH 0.400 - 4.000 uIU/mL - - -   Free T4 0.78 - 2.19 ng/dL - - -   Sodium 136 - 145 mmol/L - 141 137   Potassium 3.5 - 5.1 mmol/L - 4.4 5.2(H)   Chloride 95 - 110 mmol/L - 103 106   Carbon Dioxide 22 - 31 mmol/L - 32(H) 27   Glucose 70 - 110 mg/dL - 96 97   Blood Urea Nitrogen 7 - 18 mg/dL - 16 19(H)   Creatinine 0.50 - 1.40 mg/dL - 0.87 0.70   Calcium 8.4 - 10.2 mg/dL - 10.1 9.3   Total Protein 6.0 - 8.4 g/dL - - 7.0   Albumin 3.5 - 5.2 g/dL - 4.1 4.2   Total Bilirubin 0.2 - 1.3 mg/dL - - 0.3   AST 14 - 36 U/L - - 25   ALT 0 - 35 U/L - - 15   Anion Gap 8 - 16 mmol/L - 6(L) 4(L)   eGFR (African American) >60 mL/min/1.73 m:2 - >60 >60   eGFR (Non-African American) >60 mL/min/1.73 m:2 - >60 >60   WBC 3.90 - 12.70 K/uL - - 4.47   RBC 4.00 - 5.40 M/uL - - 4.48   Hemoglobin 12.0 - 16.0 g/dL - - 13.5   Hematocrit 37.0 - 48.5 % - - 42.9   MCV 82 - 98 fL - - 96   MCH 27.0 - 31.0 pg - - 30.1   MCHC 32.0 - 36.0 g/dL - - 31.5(L)   RDW 11.5 - 14.5 % - - 13.8   Platelets 150 - 450 K/uL - - 219   MPV 9.2 - 12.9 fL - - 10.0   Gran # 1.8 - 7.7 K/uL - - 1.9   Lymph # 1.0 - 4.8 K/uL - - 1.8   Mono # 0.3 - 1.0 K/uL - - 0.4   Eos # 0.0 - 0.5 K/uL - - 0.3   Baso # 0.00 - 0.20 K/uL - - 0.06   Gran % 38.0 - 73.0 % - - 43.5    Lymph % 18.0 - 48.0 % - - 40.0   Mono% 4.0 - 15.0 % - - 8.7   Eos % 0.0 - 8.0 % - - 6.5   Baso % 0.0 - 1.9 % - - 1.3   INR - 0.9 - -   aPTT 24.6 - 36.7 sec 34.3 - -         No results found for: HGBA1C      Assessment and plan:     Problem List Items Addressed This Visit        Cardiac/Vascular    Essential hypertension     BP controlled. Continue current meds. Pt to notify PCP if BP consistently more than 140/90. Avoid meds like HCTZ which can affect calcium.                 Renal/    Hypercalcemia     Pt with intermittent elevated calcium. Most recent wnls. Advised pt to keep hydrated. Monitor.              Endocrine    Hyperparathyroidism - Primary     ? Primary hyperparathyroidism    Last vit D 51  24 urine calcium Ca/cr ratio- 0.025 (Volume 3.6L, U ca 8.1, U cr 28, Sca 10.1, Scr 0.87)  Last DXA 12/2020-wnls  Pt with recent fracture of radius. Seemingly this is a fragility fx. Discussed indication for tx of osteoporosis and now reason to pursue parathyroidectomy. Check dexa of other wrist.  Most recent ca wnls  Kidney US for asymptomatic stones was nl 9/2021    Discussed indications for surgery for primary hyperparathyroidism. Pt notes she is overwhelmed and is not willing to consider surgery at this point.     Avoid dehydration, excessive calcium supplementation and meds (HCTZ)  that can worsen hypercalcemia.                 Relevant Orders    PTH, Intact    DXA Bone Density Appendicular Skeleton    Hypothyroidism     Overall clinically euthyroid. Most recent TSH suppressed. Hold biotin for 1 week then recheck thyroid hormone. Adjust dose prn.           Relevant Orders    TSH    T4, Free    Class 1 obesity due to excess calories with serious comorbidity and body mass index (BMI) of 31.0 to 31.9 in adult     BMI 31. Continue healthy low fat low carb diet and physical activity as tolerated.             Goiter     Recommend baseline thyroid US. Pt not interested at this time but will consider after her wrist  surgery.                   Labs in one week    Dexa of forearm    RTC in 6 months              Disclaimer: This note has been generated using voice-recognition software. There may be typographical errors that have been missed during proof-reading.     clear

## 2023-02-18 ENCOUNTER — EMERGENCY (EMERGENCY)
Facility: HOSPITAL | Age: 38
LOS: 1 days | Discharge: HOME | End: 2023-02-18
Attending: STUDENT IN AN ORGANIZED HEALTH CARE EDUCATION/TRAINING PROGRAM
Payer: OTHER GOVERNMENT

## 2023-02-18 VITALS
WEIGHT: 134.92 LBS | DIASTOLIC BLOOD PRESSURE: 91 MMHG | HEART RATE: 90 BPM | TEMPERATURE: 98 F | HEIGHT: 60 IN | SYSTOLIC BLOOD PRESSURE: 134 MMHG | RESPIRATION RATE: 18 BRPM | OXYGEN SATURATION: 100 %

## 2023-02-18 VITALS
TEMPERATURE: 99 F | SYSTOLIC BLOOD PRESSURE: 132 MMHG | DIASTOLIC BLOOD PRESSURE: 85 MMHG | OXYGEN SATURATION: 100 % | HEART RATE: 82 BPM | RESPIRATION RATE: 18 BRPM

## 2023-02-18 LAB
BASOPHILS # BLD AUTO: 0.05 K/UL — SIGNIFICANT CHANGE UP (ref 0–0.2)
BASOPHILS NFR BLD AUTO: 0.7 % — SIGNIFICANT CHANGE UP (ref 0–2)
EOSINOPHIL # BLD AUTO: 0.09 K/UL — SIGNIFICANT CHANGE UP (ref 0–0.5)
EOSINOPHIL NFR BLD AUTO: 1.2 % — SIGNIFICANT CHANGE UP (ref 0–6)
HCT VFR BLD CALC: 41.1 % — SIGNIFICANT CHANGE UP (ref 34.5–45)
HGB BLD-MCNC: 13 G/DL — SIGNIFICANT CHANGE UP (ref 11.5–15.5)
IMM GRANULOCYTES NFR BLD AUTO: 0.4 % — SIGNIFICANT CHANGE UP (ref 0–0.9)
LIDOCAIN IGE QN: 49 U/L — SIGNIFICANT CHANGE UP (ref 7–60)
LYMPHOCYTES # BLD AUTO: 1.37 K/UL — SIGNIFICANT CHANGE UP (ref 1–3.3)
LYMPHOCYTES # BLD AUTO: 18.5 % — SIGNIFICANT CHANGE UP (ref 13–44)
MCHC RBC-ENTMCNC: 25.8 PG — LOW (ref 27–34)
MCHC RBC-ENTMCNC: 31.6 GM/DL — LOW (ref 32–36)
MCV RBC AUTO: 81.7 FL — SIGNIFICANT CHANGE UP (ref 80–100)
MONOCYTES # BLD AUTO: 0.51 K/UL — SIGNIFICANT CHANGE UP (ref 0–0.9)
MONOCYTES NFR BLD AUTO: 6.9 % — SIGNIFICANT CHANGE UP (ref 2–14)
NEUTROPHILS # BLD AUTO: 5.37 K/UL — SIGNIFICANT CHANGE UP (ref 1.8–7.4)
NEUTROPHILS NFR BLD AUTO: 72.3 % — SIGNIFICANT CHANGE UP (ref 43–77)
NRBC # BLD: 0 /100 WBCS — SIGNIFICANT CHANGE UP (ref 0–0)
PLATELET # BLD AUTO: 296 K/UL — SIGNIFICANT CHANGE UP (ref 150–400)
RBC # BLD: 5.03 M/UL — SIGNIFICANT CHANGE UP (ref 3.8–5.2)
RBC # FLD: 13.4 % — SIGNIFICANT CHANGE UP (ref 10.3–14.5)
WBC # BLD: 7.42 K/UL — SIGNIFICANT CHANGE UP (ref 3.8–10.5)
WBC # FLD AUTO: 7.42 K/UL — SIGNIFICANT CHANGE UP (ref 3.8–10.5)

## 2023-02-18 PROCEDURE — 84702 CHORIONIC GONADOTROPIN TEST: CPT

## 2023-02-18 PROCEDURE — 83690 ASSAY OF LIPASE: CPT

## 2023-02-18 PROCEDURE — 96374 THER/PROPH/DIAG INJ IV PUSH: CPT

## 2023-02-18 PROCEDURE — 80053 COMPREHEN METABOLIC PANEL: CPT

## 2023-02-18 PROCEDURE — 36415 COLL VENOUS BLD VENIPUNCTURE: CPT

## 2023-02-18 PROCEDURE — 85025 COMPLETE CBC W/AUTO DIFF WBC: CPT

## 2023-02-18 PROCEDURE — 99284 EMERGENCY DEPT VISIT MOD MDM: CPT | Mod: 25

## 2023-02-18 PROCEDURE — 99284 EMERGENCY DEPT VISIT MOD MDM: CPT

## 2023-02-18 RX ORDER — FAMOTIDINE 10 MG/ML
1 INJECTION INTRAVENOUS
Qty: 14 | Refills: 0
Start: 2023-02-18 | End: 2023-02-24

## 2023-02-18 RX ORDER — FAMOTIDINE 10 MG/ML
20 INJECTION INTRAVENOUS ONCE
Refills: 0 | Status: COMPLETED | OUTPATIENT
Start: 2023-02-18 | End: 2023-02-18

## 2023-02-18 RX ORDER — SUCRALFATE 1 G
1 TABLET ORAL ONCE
Refills: 0 | Status: COMPLETED | OUTPATIENT
Start: 2023-02-18 | End: 2023-02-18

## 2023-02-18 RX ADMIN — FAMOTIDINE 20 MILLIGRAM(S): 10 INJECTION INTRAVENOUS at 12:37

## 2023-02-18 RX ADMIN — Medication 1 GRAM(S): at 12:43

## 2023-02-18 NOTE — ED PROVIDER NOTE - OBJECTIVE STATEMENT
38 y/o F PMH HTN, preeclampsia, GERD presents w/ left-sided abdominal pain radiating to the left flank x2 weeks. Pt describes the pain as aching and it worsens after eating and drinking coffee. Says she feels gassy and pain is alleviated after passing flatus and BM's. Last BM was yesterday. Reports she was previously taking Nexium but stopped 1 week ago because it was not helping her symptoms. Took Mylanta yesterday for the pain but it did not help. Denies fevers/chills, chest pain, SOB, nausea, vomiting, diarrhea, constipation, melena, hematochezia, dysuria, hematuria, or polyuria.
Yes

## 2023-02-18 NOTE — ED PROVIDER NOTE - CLINICAL SUMMARY MEDICAL DECISION MAKING FREE TEXT BOX
38 y/o F PMH HTN, preeclampsia, GERD presents w/ left-sided abdominal pain radiating to the left flank x2 weeks. Pain worsens after eating and drinking coffee. Says she feels gassy and pain is alleviated after passing flatus and BM's. Last BM yesterday. Exam without TTP of abdomen, + BS in 4 quadrants, no CVA tenderness b/l. Ordered basic labs, HCG, lipase. Will give Pepcid and Carafate for suspected gastritis or ulcer disease.

## 2023-02-18 NOTE — ED PROVIDER NOTE - CCCP TRG CHIEF CMPLNT
REVIEW OF SYSTEMS:  CONSTITUTIONAL: No fever, chills, night sweats, or fatigue  EYES: No eye pain, visual disturbances, or discharge  ENMT:  No difficulty hearing, tinnitus, vertigo; No sinus or throat pain  NECK: No pain or stiffness  RESPIRATORY: No cough, wheezing, or hemoptysis; No shortness of breath  CARDIOVASCULAR: No chest pain, palpitations, dizziness, or leg swelling  GASTROINTESTINAL: No abdominal or epigastric pain. No nausea, vomiting, or hematemesis; No diarrhea or constipation. No melena or hematochezia.  GENITOURINARY: No dysuria, frequency, hematuria, or incontinence  NEUROLOGICAL: No headaches, memory loss, loss of strength, numbness, or tremors  SKIN: No itching, burning, rashes, or lesions   LYMPH NODES: No enlarged glands  ENDOCRINE: No heat or cold intolerance; No hair loss  MUSCULOSKELETAL: No joint pain or swelling; No muscle, back, or extremity pain  PSYCHIATRIC: No depression, anxiety, mood swings, or difficulty sleeping  HEME/LYMPH: No easy bruising, or bleeding gums  ALLERY AND IMMUNOLOGIC: No hives or eczema abdominal pain REVIEW OF SYSTEMS:  CONSTITUTIONAL: No fever, chills, night sweats, or fatigue  EYES: Blind in R eye, No eye pain, visual disturbances, or discharge  ENMT:  +Throat pain, + vertigo occasionally. No difficulty hearing, tinnitus.  NECK: No pain or stiffness  RESPIRATORY: No cough, wheezing, or hemoptysis; No shortness of breath  CARDIOVASCULAR: No chest pain, palpitations, dizziness, or leg swelling  GASTROINTESTINAL: No abdominal or epigastric pain. No nausea, vomiting, or hematemesis; No diarrhea or constipation. No melena or hematochezia.  GENITOURINARY: No dysuria, frequency, hematuria, or incontinence  NEUROLOGICAL: No headaches, memory loss, loss of strength, numbness, or tremors  SKIN: No itching, burning, rashes, or lesions   MUSCULOSKELETAL: No joint pain or swelling; No muscle, back, or extremity pain Constitutional: No generalized weakness, fevers, chills, or weight loss  Eyes: +R eye blindness. No visual changes, double vision, or eye pain.  Ears, Nose, Mouth, Throat: +Throat pain. No runny nose, sinus pain, ear pain, tinnitus, dysphagia, or odynophagia.  Cardiovascular: No chest pain, palpitations, or LE edema  Respiratory: No cough, wheezing, hemoptysis, or shortness of breath  Gastrointestinal: No abdominal pain, nausea/vomiting, diarrhea/constipation, hematemesis, melena, or BRBPR  Genitourinary: No dysuria, frequency, urgency, or hematuria  Musculoskeletal: No back pain or joint pain, swelling, or decreased ROM  Skin: No pruritus or rashes  Neurologic: No syncope, seizures, vertigo, headache, paresthesias, numbness, or limb weakness  Psychiatric: No depression, anxiety, or agitation  Endocrine: No heat/cold intolerance, mood swings, sweats, polydipsia, or polyuria  Hematologic/lymphatic: No purpura, petechia, or prolonged or excessive bleeding after dental extraction / injury  Allergic/Immunologic: No anaphylaxis or allergic response to materials, foods, animals    Positives and pertinent negatives noted and all other systems negative.

## 2023-02-18 NOTE — ED PROVIDER NOTE - NS ED ROS FT
REVIEW OF SYSTEMS:    CONSTITUTIONAL: No weakness, fevers, or chills  EYES/ENT: No visual changes, vertigo, or throat pain   NECK: No pain or stiffness  RESPIRATORY: No cough, wheezing, hemoptysis, or shortness of breath  CARDIOVASCULAR: No chest pain or palpitations  GASTROINTESTINAL: Positive for left-sided abdominal and flank pain; No nausea, vomiting, or hematemesis; No diarrhea or constipation; No melena or hematochezia  GENITOURINARY: No dysuria, frequency, or hematuria  NEUROLOGICAL: No numbness or weakness  SKIN: No itching or rashes

## 2023-02-18 NOTE — ED ADULT TRIAGE NOTE - GLASGOW COMA SCALE: EYE OPENING, MLM
I have reviewed discharge instructions with the patient. The patient verbalized understanding. Patient ambulated out of ER w/  at side. Steady gait noted.
(E4) spontaneous

## 2023-02-18 NOTE — ED PROVIDER NOTE - PATIENT PORTAL LINK FT
You can access the FollowMyHealth Patient Portal offered by [facility] by registering at the following website: [email or web address]. By joining [employer]’s FollowMyHealth portal, you will also be able to view your health information using other applications (apps) compatible with our system.

## 2023-02-18 NOTE — ED ADULT TRIAGE NOTE - CHIEF COMPLAINT QUOTE
2 weeks of intermittent left upper quadrant abdominal pain radiating to left flank pain - worse after eating, achy/pressure type pain partially relieved by belching.

## 2023-02-18 NOTE — ED PROVIDER NOTE - NSFOLLOWUPINSTRUCTIONS_ED_ALL_ED_FT
You came to the ED for abdominal pain. We obtained your blood work which did not show anything significant including pancreatitis. You received Pepcid and Carafate for your symptoms. At this time, your symptoms have improved and you are ready for discharge. Please follow-up with your GI doctor outpatient within 1 week for continued monitoring and management of your symptoms. Please also follow-up with your primary care doctor within 2 weeks for management of your other chronic conditions. If you experience fevers, chest pain, shortness of breath, worsening abdominal pain, bloody vomiting, bloody stools, or have any other concerns, please call your doctor or come to the ED for immediate evaluation. You came to the ED for abdominal pain. We obtained your blood work which did not show anything significant including pancreatitis. You received Pepcid and Carafate for your symptoms. At this time, your symptoms have improved and you are ready for discharge. Please take Pepcid as prescribed at home. Please follow-up with your GI doctor outpatient within 1 week for continued monitoring and management of your symptoms. Please also follow-up with your primary care doctor within 2 weeks for management of your other chronic conditions. If you experience fevers, chest pain, shortness of breath, worsening abdominal pain, bloody vomiting, bloody stools, or have any other concerns, please call your doctor or come to the ED for immediate evaluation.

## 2023-02-18 NOTE — ED ADULT NURSE NOTE - OBJECTIVE STATEMENT
37y female presents to ED c/o LUQ abdominal pain x 2 weeks, worse after eating. States that the pan radiates to left flank.  A & O x 4, in no acute distress, vital signs stable, ambulatory. Labs sent, stat lock in place. Reassessment is ongoing.

## 2023-02-18 NOTE — ED PROVIDER NOTE - PHYSICAL EXAMINATION
T(C): 36.8 (02-18-23 @ 11:32), Max: 36.8 (02-18-23 @ 11:32)  HR: 90 (02-18-23 @ 11:32) (90 - 90)  BP: 134/91 (02-18-23 @ 11:32) (134/91 - 134/91)  RR: 18 (02-18-23 @ 11:32) (18 - 18)  SpO2: 100% (02-18-23 @ 11:32) (100% - 100%)    CONSTITUTIONAL: Well groomed, no apparent distress  EYES: PERRLA and symmetric, EOMI, No conjunctival or scleral injection, non-icteric  ENMT: Oral mucosa with moist membranes. No pharyngeal injection or exudates  NECK: Supple, symmetric   RESP: No respiratory distress, no use of accessory muscles; CTA b/l, no WRR  CV: RRR, +S1S2, no MRG; no JVD; no peripheral edema  GI: Soft, NT, ND, no rebound, no guarding; no palpable masses; no hepatosplenomegaly; no CVA tenderness b/l; positive BS in all four quadrants  MSK: Normal ROM of all extremities without pain, normal muscle strength/tone  SKIN: No rashes or ulcers noted   NEURO: CN II-XII intact; sensation intact in upper and lower extremities b/l to light touch   PSYCH: Appropriate insight/judgment; A+O x 3

## 2023-02-18 NOTE — ED ADULT NURSE NOTE - CHPI ED NUR SYMPTOMS NEG
no blood in stool/no burning urination/no chills/no diarrhea/no dysuria/no fever/no hematuria/no vomiting no blood in stool/no burning urination/no chills/no diarrhea/no dysuria/no fever/no hematuria/no nausea/no vomiting

## 2024-03-12 NOTE — ED ADULT TRIAGE NOTE - TEMPERATURE IN FAHRENHEIT (DEGREES F)
98.3 Consent: The patient's verbal consent was obtained including but not limited to risks of crusting, scabbing, blistering, scarring, darker or lighter pigmentary change, recurrence, incomplete removal and infection. Number Of Freeze-Thaw Cycles: 1 freeze-thaw cycle Post-Care Instructions: I reviewed with the patient in detail post-care instructions. Patient is to wear sunprotection, and avoid picking at any of the treated lesions. Pt may apply Vaseline to crusted or scabbing areas. Render Post-Care Instructions In Note?: no Duration Of Freeze Thaw-Cycle (Seconds): 5 Show Aperture Variable?: Yes Detail Level: Simple